# Patient Record
Sex: FEMALE | Race: WHITE | URBAN - METROPOLITAN AREA
[De-identification: names, ages, dates, MRNs, and addresses within clinical notes are randomized per-mention and may not be internally consistent; named-entity substitution may affect disease eponyms.]

---
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
## 2018-02-19 ENCOUNTER — TELEPHONE (OUTPATIENT)
Dept: FAMILY MEDICINE | Facility: CLINIC | Age: 18
End: 2018-02-19

## 2018-02-19 ENCOUNTER — OFFICE VISIT (OUTPATIENT)
Dept: FAMILY MEDICINE | Facility: CLINIC | Age: 18
End: 2018-02-19
Payer: COMMERCIAL

## 2018-02-19 VITALS
RESPIRATION RATE: 18 BRPM | BODY MASS INDEX: 21.95 KG/M2 | OXYGEN SATURATION: 100 % | HEART RATE: 72 BPM | DIASTOLIC BLOOD PRESSURE: 70 MMHG | SYSTOLIC BLOOD PRESSURE: 98 MMHG | TEMPERATURE: 99.9 F | HEIGHT: 64 IN | WEIGHT: 128.6 LBS

## 2018-02-19 DIAGNOSIS — H53.9 VISUAL DISTURBANCE: Primary | ICD-10-CM

## 2018-02-19 DIAGNOSIS — R51.9 NONINTRACTABLE EPISODIC HEADACHE, UNSPECIFIED HEADACHE TYPE: ICD-10-CM

## 2018-02-19 PROCEDURE — 99203 OFFICE O/P NEW LOW 30 MIN: CPT | Performed by: PHYSICIAN ASSISTANT

## 2018-02-19 RX ORDER — LEVONORGESTREL/ETHIN.ESTRADIOL 0.1-0.02MG
TABLET ORAL
COMMUNITY
Start: 2018-01-17

## 2018-02-19 ASSESSMENT — PAIN SCALES - GENERAL: PAINLEVEL: MILD PAIN (2)

## 2018-02-19 NOTE — TELEPHONE ENCOUNTER
Patient was driving while speaking with RN on the phone. Stated she felt same to drive and symptoms only occur 3-4 times a day since this weekend.     Latoya Powers is a 17 year old female who calls with headaches causing nausea and dizziness.    NURSING ASSESSMENT:  Description: Symptoms started with a headache with tunneled vision that occurs a few times a day. Has been feeling nauseous due to the headache and dizzy. Temperature earlier today was 99.9F tympanically taken. When she gets this tunneled vision, starts to feel dizzy and lightheaded and has vomited from it. When the lightheadedness occurs, it makes it hard to walk. Runny nose/sniffles. Ate last around 12-12:30pm ate pizza. Denies body aches, cough, sore throat, fever, visual changes, severe pain.   Onset/duration:  Friday night to Saturday morning  Precip. factors:  none  Associated symptoms:  Headache, dizzy and nausea  Improves/worsens symptoms:  Intermittent a few times a day  Pain scale (0-10)   3/10 after taking Tylenol  LMP/preg/breast feeding: taking birth control pills   Last exam/Treatment:  01/11/2016  Allergies: No Known Allergies    NURSING PLAN: Nursing advice to patient to be evaluted in clinic    RECOMMENDED DISPOSITION:  See in 2-4 hours - for OV today  Will comply with recommendation: Yes  If further questions/concerns or if symptoms do not improve, worsen or new symptoms develop, call your PCP or [facility] as soon as possible.    NOTES:  Disposition was determined by the first positive assessment question, therefore all previous assessment questions were negative    Guideline used:  Telephone Triage Protocols for Nurses, Fifth Edition, Christine Ash  Headaches  Dizziness  Nursing Judgment    Yvonne Kenney, RN, BSN

## 2018-02-19 NOTE — LETTER
[facility]  [address], [address]  [place] [address]  Phone: [phone]  Fax: [phone]    February 19, 2018        Latoya Powers  [address] [address]          To whom it may concern:    RE: Latoya Powers    Patient was seen and treated today at our clinic and missed work. Please excuse work absence today 02/19/18-02/21/2018.    Please contact me for questions or concerns.      Sincerely,        Marysol Soria PA-C

## 2018-02-19 NOTE — TELEPHONE ENCOUNTER
Reason for call:  Patient reporting a symptom    Symptom or request: nausea/headache/dizzy    Duration (how long have symptoms been present): 2 days    Have you been treated for this before? No    Additional comments: pt scheduled in [place] at 4:40 with [facility]. Pt states had sx since Saturday     Phone Number patient can be reached at:  Cell number on file:    Telephone Information:   Mobile [phone]       Best Time:  ANY    Can we leave a detailed message on this number:  YES    Call taken on 2/19/2018 at 3:21 PM by Taylor Combs

## 2018-02-19 NOTE — MR AVS SNAPSHOT
"              After Visit Summary   2018    Latoya Powers    MRN: [id number]           Patient Information     Date Of Birth          2000        Visit Information        Provider Department      2018 4:40 PM Marysol Soria PA-C [facility]        Today's Diagnoses     Visual disturbance    -  1    Nonintractable episodic headache, unspecified headache type           Follow-ups after your visit        Who to contact     If you have questions or need follow up information about today's clinic visit or your schedule please contact [facility] directly at [phone].  Normal or non-critical lab and imaging results will be communicated to you by BabyListhart, letter or phone within 4 business days after the clinic has received the results. If you do not hear from us within 7 days, please contact the clinic through BabyListhart or phone. If you have a critical or abnormal lab result, we will notify you by phone as soon as possible.  Submit refill requests through Solio or call your pharmacy and they will forward the refill request to us. Please allow 3 business days for your refill to be completed.          Additional Information About Your Visit        MyChart Information     Solio lets you send messages to your doctor, view your test results, renew your prescriptions, schedule appointments and more. To sign up, go to [email or web address] . Click on \"Log in\" on the left side of the screen, which will take you to the Welcome page. Then click on \"Sign up Now\" on the right side of the page.     You will be asked to enter the access code listed below, as well as some personal information. Please follow the directions to create your username and password.     Your access code is: [email or web address]  Expires: 2018 10:31 PM     Your access code will  in 90 days. If you need help or a new code, please call your [facility] or [phone].        Care EveryWhere ID     This is " "your Care EveryWhere ID. This could be used by other organizations to access your [facility] medical records  [id number]        Your Vitals Were     Pulse Temperature Respirations Height Last Period Pulse Oximetry    72 99.9  F (37.7  C) (Temporal) 18 5' 3.78\" (1.62 m) 01/15/2018 (Approximate) 100%    Breastfeeding? BMI (Body Mass Index)                No 22.23 kg/m2           Blood Pressure from Last 3 Encounters:   02/19/18 98/70   10/28/14 102/68   05/16/13 122/80    Weight from Last 3 Encounters:   02/19/18 128 lb 9.6 oz (58.3 kg) (51 %)*   01/11/16 125 lb (56.7 kg) (54 %)*   12/16/15 125 lb (56.7 kg) (54 %)*     * Growth percentiles are based on [facility] 2-20 Years data.              Today, you had the following     No orders found for display       Primary Care Provider Fax #    Physician No Ref-Primary [phone]       No address on file        Equal Access to Services     [facility] : Hadii alex garcíao Sorod, waaxda luqadaha, qaybta kaalmada adeleyla, oksana vargas . So [facility] [phone].    ATENCIÓN: Si habla español, tiene a orlando disposición servicios gratuitos de asistencia lingüística. Llame al [phone].    We comply with applicable federal civil rights laws and [place] laws. We do not discriminate on the basis of race, color, national origin, age, disability, sex, sexual orientation, or gender identity.            Thank you!     Thank you for choosing [facility]  for your care. Our goal is always to provide you with excellent care. Hearing back from our patients is one way we can continue to improve our services. Please take a few minutes to complete the written survey that you may receive in the mail after your visit with us. Thank you!             Your Updated Medication List - Protect others around you: Learn how to safely use, store and throw away your medicines at www.disposemymeds.org.          This list is accurate as of 2/19/18 10:31 PM.  Always " use your most recent med list.                   Brand Name Dispense Instructions for use Diagnosis    levonorgestrel-ethinyl estradiol 0.1-20 MG-MCG per tablet    ROHIT BLOOM LESSINA          meloxicam 15 MG tablet    MOBIC    30 tablet    Take 1 tablet (15 mg) by mouth daily    Anterior knee pain, right

## 2018-02-19 NOTE — PROGRESS NOTES
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 Had been having headache and had abrupt onset of visual disturbance.   \"vision looked like looking through a kaleidescope\"- from both eyes.   Had a hard time working- had to concentrate very hard to focus to see to work.   Vision change lasted an hour but LING persisted.    Today has had 3 episodes of the vision change- most recent lunch time- lasted shorter duration 15minutes.    Has had headache in the temples, pulsating/pounding.   \"Always nauseated\"- not just w/HA.   Vomited after lunch today.   No hx of migraines in self or family members.     Is on OCP- has been on for a few years. \"More headaches lately with my period and my pill\"    Drove herself here today.     No head injury or trauma       Description:   Do you feel faint:  YES  Does it feel like the surroundings (bed, room) are moving: YES- unsure  Unsteady/off balance: YES  Have you passed out or fallen: no     Intensity: moderate    Progression of Symptoms:  worsening    Accompanying Signs & Symptoms:  Heart palpitations: YES  Nausea, vomiting: YES  Weakness in arms or legs: no   Fatigue: YES  Vision or speech changes: YES- vision gets blurry  Ringing in ears (Tinnitus): no   Hearing Loss: no     History:   Head trauma/concussion hx: no   Previous similar symptoms: no   Recent bleeding history: no     Precipitating factors:   Worse with activity or head movement: YES  Any new medications (BP?): no   Alcohol/drug abuse/withdrawal: no     Alleviating factors:   Does staying in a fixed position give relief:  YES- sometimes  Therapies Tried and outcome: ibuprofen, does not help    Headache  Onset: 1 month    Description:   Location: bilateral in the temporal area   Character: throbbing pain, dull pain, squeezing " "pain  Frequency:  daily  Duration:  All day    Intensity: moderate    Progression of Symptoms:  worsening    Accompanying Signs & Symptoms:  Stiff neck: YES  Neck or upper back pain: no   Fever: YES- 99.9  Sinus pressure: YES  Nausea or vomiting: YES  Dizziness: YES  Numbness: no   Weakness: no   Visual changes: YES- \"kind of\"    History:   Head trauma: no   Family history of migraines: YES  Previous tests for headaches: no  Neurologist evaluations: no  Able to do daily activities: YES  Wake with a headaches: no   Do headaches wake you up: YES- only when menstruating   Daily pain medication use: no   Work/school stressors/changes: no     Precipitating factors:   Does light make it worse: no  Does sound make it worse: no    Alleviating factors:  Does sleep help: YES    Therapies Tried and outcome: Ibuprofen (Advil, Motrin) does not help  Problem list and histories reviewed & adjusted, as indicated.  Additional history: as documented      Patient Active Problem List   Diagnosis   (none) - all problems resolved or deleted     History reviewed. No pertinent surgical history.    Social History   Substance Use Topics     Smoking status: Never Smoker     Smokeless tobacco: Never Used     Alcohol use No     History reviewed. No pertinent family history.      Current Outpatient Prescriptions   Medication Sig Dispense Refill     levonorgestrel-ethinyl estradiol (AVIANE,ALESSE,LESSINA) 0.1-20 MG-MCG per tablet        meloxicam (MOBIC) 15 MG tablet Take 1 tablet (15 mg) by mouth daily (Patient not taking: Reported on 2/19/2018) 30 tablet 1     Allergies   Allergen Reactions     Amoxicillin Diarrhea     BP Readings from Last 3 Encounters:   02/19/18 98/70   10/28/14 102/68   05/16/13 122/80    Wt Readings from Last 3 Encounters:   02/19/18 128 lb 9.6 oz (58.3 kg) (51 %)*   01/11/16 125 lb (56.7 kg) (54 %)*   12/16/15 125 lb (56.7 kg) (54 %)*     * Growth percentiles are based on CDC 2-20 Years data.                  Labs " "reviewed in EPIC    ROS:  Constitutional, HEENT, cardiovascular, pulmonary, gi and gu systems are negative, except as otherwise noted.    OBJECTIVE:     BP 98/70  Pulse 72  Temp 99.9  F (37.7  C) (Temporal)  Resp 18  Ht 5' 3.78\" (1.62 m)  Wt 128 lb 9.6 oz (58.3 kg)  LMP 01/15/2018 (Approximate)  SpO2 100%  Breastfeeding? No  BMI 22.23 kg/m2  Body mass index is 22.23 kg/(m^2).  GENERAL: healthy, alert and no distress  EYES: Eyes grossly normal to inspection, PERRL and conjunctivae and sclerae normal  HENT: ear canals and TM's normal, nose and mouth without ulcers or lesions  NECK: no adenopathy, no asymmetry, masses, or scars and thyroid normal to palpation  RESP: lungs clear to auscultation - no rales, rhonchi or wheezes  CV: regular rate and rhythm, normal S1 S2, no S3 or S4, no murmur, click or rub, no peripheral edema and peripheral pulses strong  ABDOMEN: soft, nontender, no hepatosplenomegaly, no masses and bowel sounds normal  MS: no gross musculoskeletal defects noted, no edema  NEURO:  CN 2-12 intact, cerebellar exam normal- fluid finger-nose pursuit and heel-shin trace. Reflexes symmetric patellar and brachial. Strength 5/5 and symmetric in extremities. Gait normal. Romberg negative/normal.   BACK: no CVA tenderness, no paralumbar tenderness  PSYCH: mentation appears normal, affect normal/bright  LYMPH: normal ant/post cervical, supraclavicular nodes    Diagnostic Test Results:  none     ASSESSMENT/PLAN:     1. Visual disturbance  2. Nonintractable episodic headache, unspecified headache type  Over past 48 hrs pt has had 4 episodes of \"kaleidescope\" visual disturbances, impairing ability to work, while having a significant HA.   Is on OCPs.  Not a typical aura pattern.   Normal neuro exam here.   Advised ER evaluation. Likely should have imaging.   Advised should have family member drive. Pt states she is comfortable driving herself at this time.     Follow Up: To the ER.    Marysol Soria, " ZACHARIAH  [facility]

## 2018-02-20 ENCOUNTER — TELEPHONE (OUTPATIENT)
Dept: FAMILY MEDICINE | Facility: CLINIC | Age: 18
End: 2018-02-20

## 2018-02-20 NOTE — TELEPHONE ENCOUNTER
Attempted to contact patient. Phone went to voicemail and mailbox is full.Yvonne Kenney RN, BSN

## 2018-02-20 NOTE — TELEPHONE ENCOUNTER
My recommendation is the same as it was last night. She should be seen in the ER. Marysol Soria PA-C

## 2018-02-20 NOTE — TELEPHONE ENCOUNTER
Pt called back.  Let pt know that Marysol Soria also recommends she be seen in the ED.  Pt will go to ED.    Hattie Lopez RN

## 2025-01-07 NOTE — TELEPHONE ENCOUNTER
"KP: please review and advise. Patient was seen in clinic yesterday, \"Advised ER evaluation. Likely should have imaging.\" Mother declined to take patient to the ED and did not go yesterday. Today called to see if there is anything she could do at home for her symptoms. Discussed the concern of waiting without knowing if there is a concern of a bleed or clot on the brain. Stated she would try to go to the ED today. Would you like anything else relayed at this time?    Latoya Powers is a 17 year old female who calls with wondering if there are home care measures for her at this time.    NURSING ASSESSMENT:  Description:  Yesterday patient was recommended, \"Advised ER evaluation. Likely should have imaging.\" Mother had a history of a stroke when she was on birth control. Patient has been encouraged not to drive herself. Mother has declined to drive the daughter to the ED. RN asked if a friend could take her or if she would feel comfortable call for an ambulance to transport her.   Onset/duration: per OV yesterday recommended ED   Precip. factors:  Oral birth control contraception  Associated symptoms:  Headaches, visual changes, lightheadedness/dizziness  Improves/worsens symptoms:  Same intermittent episodes   Pain scale (0-10)   varies  LMP/preg/breast feeding:  No LMP recorded.  Last exam/Treatment:  02/19/2018  Allergies:   Allergies   Allergen Reactions     Amoxicillin Diarrhea     NURSING PLAN: Routed to provider Yes     RECOMMENDED DISPOSITION: Recommended to stick with providers recommendations from OV 02/19/2018  Will comply with recommendation: will reach out to friends  If further questions/concerns or if symptoms do not improve, worsen or new symptoms develop, call your PCP or [facility] as soon as possible.    NOTES:  Disposition was determined by the first positive assessment question, therefore all previous assessment questions were negative    Guideline used:  Telephone Triage Protocols for " - Hx outside of pregnancy         Nurses, Fifth Edition, Christine Ash  Headaches  Nursing judgment  Routing to     Yvonne Kenney, RN, BSN